# Patient Record
Sex: FEMALE | URBAN - METROPOLITAN AREA
[De-identification: names, ages, dates, MRNs, and addresses within clinical notes are randomized per-mention and may not be internally consistent; named-entity substitution may affect disease eponyms.]

---

## 2022-02-17 ENCOUNTER — LAB OUTSIDE AN ENCOUNTER (OUTPATIENT)
Dept: URBAN - METROPOLITAN AREA CLINIC 33 | Facility: CLINIC | Age: 62
End: 2022-02-17

## 2022-02-17 ENCOUNTER — OFFICE VISIT (OUTPATIENT)
Dept: URBAN - METROPOLITAN AREA CLINIC 33 | Facility: CLINIC | Age: 62
End: 2022-02-17
Payer: COMMERCIAL

## 2022-02-17 VITALS
SYSTOLIC BLOOD PRESSURE: 130 MMHG | WEIGHT: 286.4 LBS | BODY MASS INDEX: 47.72 KG/M2 | HEART RATE: 103 BPM | DIASTOLIC BLOOD PRESSURE: 83 MMHG | OXYGEN SATURATION: 96 % | HEIGHT: 65 IN

## 2022-02-17 DIAGNOSIS — R74.8 ELEVATED LIVER ENZYMES: ICD-10-CM

## 2022-02-17 PROCEDURE — 99244 OFF/OP CNSLTJ NEW/EST MOD 40: CPT | Performed by: INTERNAL MEDICINE

## 2022-02-17 PROCEDURE — 99204 OFFICE O/P NEW MOD 45 MIN: CPT | Performed by: INTERNAL MEDICINE

## 2022-02-17 RX ORDER — LISINOPRIL 5 MG/1
1 TABLET TABLET ORAL ONCE A DAY
Status: ACTIVE | COMMUNITY

## 2022-02-17 RX ORDER — AMLODIPINE BESYLATE 5 MG/1
1 TABLET TABLET ORAL ONCE A DAY
Status: ACTIVE | COMMUNITY

## 2022-02-17 RX ORDER — GOLIMUMAB 50 MG/4ML
AS DIRECTED SOLUTION INTRAVENOUS
Status: ACTIVE | COMMUNITY

## 2022-02-17 NOTE — HPI-TODAY'S VISIT:
61 year old female presents today for consult of elevated liver enzymes. Patient was referred by Dr. Tomas, her Endocrinologist.  Last labs completed were 2/14/2021 AST: 78, ALT: 86. She denies any imaging. No previous GI workup. Patient denies any personal or family history of liver disease.  Currenlty having 1-2 bowel movement a day. Stools are normal without the presence of blood, mucus, and melena.  Patient was diagnosed with  Breast Cancer in 2014. She underwent chemotherapy in 2014. Patient Oncologist was  Dr. Dorian Jaramillo. She also has a hx of having Thyroid cancer many yrs ago. Patient has  RA and is on Samponi infusions monthly  Patient denies any symptoms of jaundice, chills, RUQ pain, dizziness, easy bruising. She admits to having so fatigue.    Labs 2/14/22 CBC: Normal  AST: 78 ALT: 86 ALK: 66 Tot Chu: 0.4 Bun/creat: 27 Albumin: 4.3  Hepatitis Panel: Hep A Ab, IgM: Negative HBsAg Screen: Negative Hep B Core Ab, IgM: Negative Hep C Virus: <0.1  TB: Negative  Sed rate: 66 CRP: 9  Labs 10/11/21 AST: 112 ALT: 64 ALK: 45 Albumin: 4.2 Tot Chu: 0.4  Sed rate: 42 CRP: 14

## 2022-02-17 NOTE — PHYSICAL EXAM GASTROINTESTINAL
Abdomen , soft, nontender, nondistended , no guarding or rigidity , no masses palpable , normal bowel sounds , Liver and Spleen , no hepatomegaly present , no hepatosplenomegaly , liver nontender , spleen not palpable, large panniculus

## 2022-02-17 NOTE — PHYSICAL EXAM CONSTITUTIONAL:
well developed,  in no acute distress , ambulating without difficulty , normal communication ability, morbid obesity

## 2022-03-31 ENCOUNTER — OFFICE VISIT (OUTPATIENT)
Dept: URBAN - METROPOLITAN AREA CLINIC 33 | Facility: CLINIC | Age: 62
End: 2022-03-31

## 2022-03-31 RX ORDER — AMLODIPINE BESYLATE 5 MG/1
1 TABLET TABLET ORAL ONCE A DAY
Status: ACTIVE | COMMUNITY

## 2022-03-31 RX ORDER — GOLIMUMAB 50 MG/4ML
AS DIRECTED SOLUTION INTRAVENOUS
Status: ACTIVE | COMMUNITY

## 2022-03-31 RX ORDER — LISINOPRIL 5 MG/1
1 TABLET TABLET ORAL ONCE A DAY
Status: ACTIVE | COMMUNITY

## 2022-03-31 NOTE — HPI-TODAY'S VISIT:
Patient present today for 6 week follow-up. Patient admits/denies any symptoms of  jaundice, chills, RUQ pains, dizziness, easy bruising, fatigue. Patient states she has/not had labs or US completed.

## 2022-05-06 ENCOUNTER — OFFICE VISIT (OUTPATIENT)
Dept: URBAN - METROPOLITAN AREA CLINIC 31 | Facility: CLINIC | Age: 62
End: 2022-05-06

## 2022-05-26 ENCOUNTER — OFFICE VISIT (OUTPATIENT)
Dept: URBAN - METROPOLITAN AREA CLINIC 33 | Facility: CLINIC | Age: 62
End: 2022-05-26

## 2022-05-26 ENCOUNTER — DASHBOARD ENCOUNTERS (OUTPATIENT)
Age: 62
End: 2022-05-26

## 2022-05-26 RX ORDER — LISINOPRIL 5 MG/1
1 TABLET TABLET ORAL ONCE A DAY
Status: ACTIVE | COMMUNITY

## 2022-05-26 RX ORDER — AMLODIPINE BESYLATE 5 MG/1
1 TABLET TABLET ORAL ONCE A DAY
Status: ACTIVE | COMMUNITY

## 2022-05-26 RX ORDER — GOLIMUMAB 50 MG/4ML
AS DIRECTED SOLUTION INTRAVENOUS
Status: ACTIVE | COMMUNITY

## 2022-05-26 NOTE — HPI-TODAY'S VISIT:
Patient present today for 6 week follow-up. Patient admits/denies any symptoms of jaundice, chills, RUQ pains, dizziness, easy bruising, fatigue.  Patient had some labs completed recently. Patient denies any alcohol abuse, she continues not to drink any alcohol.   RISK FACTORS: Denies  Alcohol, drugs, travel outside the [place], NSAIDs, protein supplements, tattoos, piercings,  service, blood transfusion, family history of liver disease or cancer, personal exposure to liver diseases, USP, rehab.   Labs 01.18.2022 Glucose: 191 H AST: 171 H ALT: 175 H Hgb A1c: 8.7 H TSH w/Reflex: 0.21 L T4,Free: 2.6 H Vitamin D: 25 L  Labs 02.16.2022 Glucose: 188 H BUN/Creatinine: 27 H Carbon Dioxide, Total: 19 L AST: 78 H ALT: 86 H Sed Rate: 66 H  Labs 03.07.2022 Cholesterol, Total: 238 H LDL-Cholesterol: 155 H Glucose: 135 H Creatinine:1.16 H T4,Free: 0.6 L TSH: 54.27 H Hgb A1c: 8.2 H

## 2022-07-27 ENCOUNTER — TELEPHONE ENCOUNTER (OUTPATIENT)
Dept: URBAN - METROPOLITAN AREA CLINIC 78 | Facility: CLINIC | Age: 62
End: 2022-07-27

## 2022-07-28 ENCOUNTER — OFFICE VISIT (OUTPATIENT)
Dept: URBAN - METROPOLITAN AREA CLINIC 33 | Facility: CLINIC | Age: 62
End: 2022-07-28

## 2022-07-28 RX ORDER — LISINOPRIL 5 MG/1
1 TABLET TABLET ORAL ONCE A DAY
Status: ACTIVE | COMMUNITY

## 2022-07-28 RX ORDER — AMLODIPINE BESYLATE 5 MG/1
1 TABLET TABLET ORAL ONCE A DAY
Status: ACTIVE | COMMUNITY

## 2022-07-28 RX ORDER — GOLIMUMAB 50 MG/4ML
AS DIRECTED SOLUTION INTRAVENOUS
Status: ACTIVE | COMMUNITY

## 2022-07-28 NOTE — HPI-TODAY'S VISIT:
Patient currently ____ jaundice, chills, RUQ pains, dizziness, easy bruising, fatigue. Admits/Denies swelling in lower extremities and abdomen.